# Patient Record
Sex: MALE | Race: WHITE
[De-identification: names, ages, dates, MRNs, and addresses within clinical notes are randomized per-mention and may not be internally consistent; named-entity substitution may affect disease eponyms.]

---

## 2017-08-30 ENCOUNTER — HOSPITAL ENCOUNTER (OUTPATIENT)
Dept: HOSPITAL 80 - BMCIMAGING | Age: 72
End: 2017-08-30
Attending: INTERNAL MEDICINE
Payer: COMMERCIAL

## 2017-08-30 DIAGNOSIS — I51.7: Primary | ICD-10-CM

## 2018-12-03 ENCOUNTER — HOSPITAL ENCOUNTER (OUTPATIENT)
Dept: HOSPITAL 80 - FIMAGING | Age: 73
End: 2018-12-03
Attending: DERMATOLOGY
Payer: COMMERCIAL

## 2018-12-03 DIAGNOSIS — C43.9: Primary | ICD-10-CM

## 2018-12-03 DIAGNOSIS — I77.810: ICD-10-CM

## 2018-12-03 DIAGNOSIS — R16.0: ICD-10-CM

## 2018-12-03 DIAGNOSIS — R91.1: ICD-10-CM

## 2018-12-03 DIAGNOSIS — R59.0: ICD-10-CM

## 2018-12-03 DIAGNOSIS — R16.1: ICD-10-CM

## 2018-12-03 PROCEDURE — 71260 CT THORAX DX C+: CPT

## 2018-12-03 PROCEDURE — 74177 CT ABD & PELVIS W/CONTRAST: CPT

## 2018-12-03 PROCEDURE — 70470 CT HEAD/BRAIN W/O & W/DYE: CPT

## 2018-12-03 PROCEDURE — 70491 CT SOFT TISSUE NECK W/DYE: CPT

## 2019-01-27 ENCOUNTER — HOSPITAL ENCOUNTER (EMERGENCY)
Dept: HOSPITAL 80 - FED | Age: 74
Discharge: HOME | End: 2019-01-27
Payer: COMMERCIAL

## 2019-01-27 VITALS — DIASTOLIC BLOOD PRESSURE: 82 MMHG | SYSTOLIC BLOOD PRESSURE: 117 MMHG
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
## 2019-01-27 DIAGNOSIS — S20.20XA: Primary | ICD-10-CM
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
## 2019-01-27 DIAGNOSIS — Z79.01: ICD-10-CM
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
